# Patient Record
Sex: FEMALE | Race: WHITE | URBAN - METROPOLITAN AREA
[De-identification: names, ages, dates, MRNs, and addresses within clinical notes are randomized per-mention and may not be internally consistent; named-entity substitution may affect disease eponyms.]

---

## 2022-09-16 ENCOUNTER — OFFICE VISIT (OUTPATIENT)
Dept: URBAN - METROPOLITAN AREA CLINIC 87 | Facility: CLINIC | Age: 52
End: 2022-09-16
Payer: COMMERCIAL

## 2022-09-16 DIAGNOSIS — H35.052 RETINAL NEOVASCULARIZATION, UNSPECIFIED, LEFT EYE: Primary | ICD-10-CM

## 2022-09-16 PROCEDURE — 99204 OFFICE O/P NEW MOD 45 MIN: CPT | Performed by: OPHTHALMOLOGY

## 2022-09-16 PROCEDURE — 67028 INJECTION EYE DRUG: CPT | Performed by: OPHTHALMOLOGY

## 2022-09-16 PROCEDURE — 92134 CPTRZ OPH DX IMG PST SGM RTA: CPT | Performed by: OPHTHALMOLOGY

## 2022-09-16 ASSESSMENT — INTRAOCULAR PRESSURE
OD: 16
OS: 14

## 2022-09-16 NOTE — IMPRESSION/PLAN
Impression: Retinal neovascularization, unspecified, left eye: H35.052.
-treatment naive OCT:
OD: WNL
OS: RPE changes, SRF Plan: The diagnosis, natural history, and prognosis of wet AMD, as well as the risks and benefits of various treatment options including laser, PDT, Avastin, Lucentis and Eylea; along with the alternatives of observation or participation in a clinical trial, were discussed at length. The patient understands that treatment may not improve vision, but should reduce the risk of further visual loss. The patient understands that smoking is the most significant modifiable risk factor for the development of advanced AMD, and also understands that if they do smoke (or have history of smoking in the past 5 years), they cannot take vitamin A/beta-carotene, since it may increase their risk for lung cancer. 

Given stability of vision and exam, pt elects to proceed with Avastin OS

RTC 5 weeks DFE OU OCT OU Re-eval Avastin OS

## 2022-10-21 ENCOUNTER — OFFICE VISIT (OUTPATIENT)
Dept: URBAN - METROPOLITAN AREA CLINIC 86 | Facility: CLINIC | Age: 52
End: 2022-10-21
Payer: COMMERCIAL

## 2022-10-21 DIAGNOSIS — H35.052 RETINAL NEOVASCULARIZATION, UNSPECIFIED, LEFT EYE: Primary | ICD-10-CM

## 2022-10-21 PROCEDURE — 99214 OFFICE O/P EST MOD 30 MIN: CPT | Performed by: OPHTHALMOLOGY

## 2022-10-21 PROCEDURE — 92134 CPTRZ OPH DX IMG PST SGM RTA: CPT | Performed by: OPHTHALMOLOGY

## 2022-10-21 ASSESSMENT — INTRAOCULAR PRESSURE
OD: 18
OS: 18

## 2022-10-21 NOTE — IMPRESSION/PLAN
Impression: Retinal neovascularization, unspecified, left eye: H35.052.
-Avastin 09/16/22 OCT: 10/21/22 OD: WNL
OS: RPE changes, SRF- resolved Plan: The diagnosis, natural history, and prognosis of wet AMD, as well as the risks and benefits of various treatment options including laser, PDT, Avastin, Lucentis and Eylea; along with the alternatives of observation or participation in a clinical trial, were discussed at length. The patient understands that treatment may not improve vision, but should reduce the risk of further visual loss. The patient understands that smoking is the most significant modifiable risk factor for the development of advanced AMD, and also understands that if they do smoke (or have history of smoking in the past 5 years), they cannot take vitamin A/beta-carotene, since it may increase their risk for lung cancer. Given stability of vision and exam, pt elects to proceed with observation OS.  Tx PRN

RTC 4 weeks DFE OU OCT OU Re-eval Avastin OS

## 2022-12-30 ENCOUNTER — OFFICE VISIT (OUTPATIENT)
Dept: URBAN - METROPOLITAN AREA CLINIC 86 | Facility: CLINIC | Age: 52
End: 2022-12-30
Payer: COMMERCIAL

## 2022-12-30 DIAGNOSIS — H35.052 RETINAL NEOVASCULARIZATION, UNSPECIFIED, LEFT EYE: Primary | ICD-10-CM

## 2022-12-30 PROCEDURE — 92134 CPTRZ OPH DX IMG PST SGM RTA: CPT | Performed by: OPHTHALMOLOGY

## 2022-12-30 PROCEDURE — 99213 OFFICE O/P EST LOW 20 MIN: CPT | Performed by: OPHTHALMOLOGY

## 2022-12-30 ASSESSMENT — INTRAOCULAR PRESSURE
OS: 16
OD: 16

## 2022-12-30 NOTE — IMPRESSION/PLAN
Impression: Retinal neovascularization, unspecified, left eye: H35.052.
-Avastin 09/16/22 OCT:12/30/2022 OD: WNL
OS: RPE changes, SRF- resolved Plan: The diagnosis, natural history, and prognosis of wet AMD, as well as the risks and benefits of various treatment options including laser, PDT, Avastin, Lucentis and Eylea; along with the alternatives of observation or participation in a clinical trial, were discussed at length. The patient understands that treatment may not improve vision, but should reduce the risk of further visual loss. The patient understands that smoking is the most significant modifiable risk factor for the development of advanced AMD, and also understands that if they do smoke (or have history of smoking in the past 5 years), they cannot take vitamin A/beta-carotene, since it may increase their risk for lung cancer. Given stability of vision and exam, pt elects to proceed with observation OS.  Tx PRN

RTC 3 months at I